# Patient Record
Sex: MALE | Race: BLACK OR AFRICAN AMERICAN | NOT HISPANIC OR LATINO | URBAN - METROPOLITAN AREA
[De-identification: names, ages, dates, MRNs, and addresses within clinical notes are randomized per-mention and may not be internally consistent; named-entity substitution may affect disease eponyms.]

---

## 2022-04-15 ENCOUNTER — APPOINTMENT (RX ONLY)
Dept: URBAN - METROPOLITAN AREA CLINIC 41 | Facility: CLINIC | Age: 27
Setting detail: DERMATOLOGY
End: 2022-04-15

## 2022-04-15 DIAGNOSIS — Q80.8 OTHER CONGENITAL ICHTHYOSIS: ICD-10-CM | Status: STABLE

## 2022-04-15 DIAGNOSIS — L64.8 OTHER ANDROGENIC ALOPECIA: ICD-10-CM | Status: STABLE

## 2022-04-15 PROCEDURE — ? COUNSELING

## 2022-04-15 PROCEDURE — ? PRESCRIPTION

## 2022-04-15 PROCEDURE — ? PRESCRIPTION MEDICATION MANAGEMENT

## 2022-04-15 PROCEDURE — ? TREATMENT REGIMEN

## 2022-04-15 PROCEDURE — ? ADDITIONAL NOTES

## 2022-04-15 PROCEDURE — 99204 OFFICE O/P NEW MOD 45 MIN: CPT

## 2022-04-15 RX ORDER — AMMONIUM LACTATE 12 G/100G
LOTION TOPICAL
Qty: 400 | Refills: 3 | Status: ERX | COMMUNITY
Start: 2022-04-15

## 2022-04-15 RX ADMIN — AMMONIUM LACTATE: 12 LOTION TOPICAL at 00:00

## 2022-04-15 ASSESSMENT — LOCATION SIMPLE DESCRIPTION DERM
LOCATION SIMPLE: LEFT UPPER ARM
LOCATION SIMPLE: RIGHT UPPER ARM
LOCATION SIMPLE: RIGHT SCALP

## 2022-04-15 ASSESSMENT — LOCATION DETAILED DESCRIPTION DERM
LOCATION DETAILED: LEFT ANTERIOR DISTAL UPPER ARM
LOCATION DETAILED: RIGHT MEDIAL FRONTAL SCALP
LOCATION DETAILED: RIGHT ANTERIOR DISTAL UPPER ARM

## 2022-04-15 ASSESSMENT — LOCATION ZONE DERM
LOCATION ZONE: SCALP
LOCATION ZONE: ARM

## 2022-04-15 NOTE — PROCEDURE: PRESCRIPTION MEDICATION MANAGEMENT
Initiate Treatment: Finasteride 0.1% minoxidil 7% compound qd
Detail Level: Zone
Render In Strict Bullet Format?: No
Initiate Treatment: ammonium lactate qd

## 2022-04-15 NOTE — HPI: RASH
Is This A New Presentation, Or A Follow-Up?: Rash
Additional History: Patient has a history of ichthyosis.

## 2022-04-15 NOTE — PROCEDURE: COUNSELING
Detail Level: Detailed
Patient Specific Counseling (Will Not Stick From Patient To Patient): Patient notes he has had this condition since he was young. AF notes the patient should be applying moisturizers after showering.
Patient Specific Counseling (Will Not Stick From Patient To Patient): AF notes the patient could be iron or vitamin d deficient. AF notes the patient could do a compound medication of finasteride and minoxidil. However, this may not be covered by insurance.  Patient agrees to trying the topical. Patient states he is nervous about the side affects of the oral medication.

## 2024-12-12 NOTE — PROCEDURE: TREATMENT REGIMEN
Called [facility] Central Scheduling and scheduled the pt an exercise stress test on 12/19/24 at 8:30 AM.     Please arrive 30 minutes prior to appointment time to complete registration at the [facility]nd [facility] center.      PATIENT PREP:     *Patient should wear comfortable clothing and walking shoes.  *Nothing to eat/drink 6 hours prior to test  *Take your meds as directed by physician.     Called pt but was unable to leave a message as VM is not set up. Will try again later.         
Spoke to pt and notified him of the scheduling information and he verbalized understanding.   
Detail Level: Zone
Otc Regimen: Cetaphil rough and bumpy qod, increase usage to qd
Otc Regimen: 2000 international units qd vitamin d, multivitamin